# Patient Record
Sex: MALE | Race: WHITE | URBAN - METROPOLITAN AREA
[De-identification: names, ages, dates, MRNs, and addresses within clinical notes are randomized per-mention and may not be internally consistent; named-entity substitution may affect disease eponyms.]

---

## 2019-07-19 ENCOUNTER — NEW PATIENT (OUTPATIENT)
Dept: URBAN - METROPOLITAN AREA CLINIC 51 | Facility: CLINIC | Age: 54
End: 2019-07-19
Payer: COMMERCIAL

## 2019-07-19 DIAGNOSIS — D31.32 BENIGN NEOPLASM OF LEFT CHOROID: ICD-10-CM

## 2019-07-19 DIAGNOSIS — H25.13 AGE-RELATED NUCLEAR CATARACT, BILATERAL: ICD-10-CM

## 2019-07-19 DIAGNOSIS — H52.4 PRESBYOPIA: ICD-10-CM

## 2019-07-19 DIAGNOSIS — Z79.84 LONG TERM (CURRENT) USE OF ORAL ANTIDIABETIC DRUGS: ICD-10-CM

## 2019-07-19 DIAGNOSIS — E11.9 TYPE 2 DIABETES MELLITUS WITHOUT COMPLICATIONS: Primary | ICD-10-CM

## 2019-07-19 DIAGNOSIS — H04.123 TEAR FILM INSUFFICIENCY OF BILATERAL LACRIMAL GLANDS: ICD-10-CM

## 2019-07-19 PROCEDURE — 92250 FUNDUS PHOTOGRAPHY W/I&R: CPT | Performed by: OPTOMETRIST

## 2019-07-19 PROCEDURE — 92015 DETERMINE REFRACTIVE STATE: CPT | Performed by: OPTOMETRIST

## 2019-07-19 PROCEDURE — 92004 COMPRE OPH EXAM NEW PT 1/>: CPT | Performed by: OPTOMETRIST

## 2019-07-19 RX ORDER — CARBOXYMETHYLCELLULOSE SODIUM 5 MG/ML
0.5 % SOLUTION/ DROPS OPHTHALMIC
Qty: 90 | Refills: 6 | Status: ACTIVE
Start: 2019-07-19

## 2019-07-19 ASSESSMENT — KERATOMETRY
OD: 42.24
OS: 42.26

## 2019-07-19 ASSESSMENT — VISUAL ACUITY
OS: 20/20
OD: 20/20

## 2019-07-19 ASSESSMENT — INTRAOCULAR PRESSURE
OD: 20
OS: 20

## 2022-04-15 ENCOUNTER — OFFICE VISIT (OUTPATIENT)
Dept: URBAN - METROPOLITAN AREA CLINIC 51 | Facility: CLINIC | Age: 57
End: 2022-04-15
Payer: COMMERCIAL

## 2022-04-15 DIAGNOSIS — H16.213 EXPOSURE KERATOCONJUNCTIVITIS, BILATERAL: ICD-10-CM

## 2022-04-15 DIAGNOSIS — H43.813 VITREOUS DEGENERATION, BILATERAL: ICD-10-CM

## 2022-04-15 DIAGNOSIS — E11.9 TYPE 2 DIABETES MELLITUS WITHOUT COMPLICATIONS: Primary | ICD-10-CM

## 2022-04-15 DIAGNOSIS — H52.4 PRESBYOPIA: ICD-10-CM

## 2022-04-15 DIAGNOSIS — H25.13 AGE-RELATED NUCLEAR CATARACT, BILATERAL: ICD-10-CM

## 2022-04-15 DIAGNOSIS — Z79.84 LONG TERM (CURRENT) USE OF ORAL ANTIDIABETIC DRUGS: ICD-10-CM

## 2022-04-15 PROCEDURE — 92134 CPTRZ OPH DX IMG PST SGM RTA: CPT | Performed by: OPTOMETRIST

## 2022-04-15 PROCEDURE — 99214 OFFICE O/P EST MOD 30 MIN: CPT | Performed by: OPTOMETRIST

## 2022-04-15 PROCEDURE — 92250 FUNDUS PHOTOGRAPHY W/I&R: CPT | Performed by: OPTOMETRIST

## 2022-04-15 RX ORDER — CARBOXYMETHYLCELLULOSE SODIUM 5 MG/ML
0.5 % SOLUTION/ DROPS OPHTHALMIC
Qty: 90 | Refills: 6 | Status: ACTIVE
Start: 2022-04-15

## 2022-04-15 RX ORDER — CYCLOSPORINE 0.5 MG/ML
0.05 % EMULSION OPHTHALMIC
Qty: 120 | Refills: 6 | Status: INACTIVE
Start: 2022-04-15 | End: 2022-05-14

## 2022-04-15 NOTE — IMPRESSION/PLAN
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 *Reports he has been using Refresh Plus but not helping *Loves outdoor Plan: Hx of MRSA, dermatochalasis with hooding/steatoblepharon Pt has incomplete blinks/lid closure. I have reviewed with patient to continue with Refresh Plus ATs QID OU, keeping OU closed directly after instillation for a minimum of 30 seconds. Discussed to not use ceiling fans throughout the house. Sleep masking/goggles to wear at bedtime to prevent further exposure. Briefly reviewed Restasis. Pt wants to trial Restasis. Use BID OU. Understands medication will take 30-90 days to be effective. Begin Omega-3 FAs with EP recommended >600mg and 2-3000mg/day is most effective Warm compresses for 10 minutes of constant heat twice daily, which is the more important part of the treatment. (wet washcloth does not stay heated consistently, prefer Mina's Mask or rice in clean sock method) For debris around lid margins, use lid cleansers such as Theratears HypoChlor solution on a clean cotton pad (no cotton balls) and gently swipe along closed eyelid margins twice daily

## 2023-05-15 ENCOUNTER — OFFICE VISIT (OUTPATIENT)
Dept: URBAN - METROPOLITAN AREA CLINIC 51 | Facility: CLINIC | Age: 58
End: 2023-05-15
Payer: COMMERCIAL

## 2023-05-15 DIAGNOSIS — H25.13 AGE-RELATED NUCLEAR CATARACT, BILATERAL: ICD-10-CM

## 2023-05-15 DIAGNOSIS — H35.54 DYSTROPHIES PRIMARILY INVOLVING THE RETINAL PIGMENT EPITHELIUM: ICD-10-CM

## 2023-05-15 DIAGNOSIS — H52.4 PRESBYOPIA: ICD-10-CM

## 2023-05-15 DIAGNOSIS — H43.813 VITREOUS DEGENERATION, BILATERAL: ICD-10-CM

## 2023-05-15 DIAGNOSIS — Z79.84 LONG TERM (CURRENT) USE OF ORAL ANTIDIABETIC DRUGS: ICD-10-CM

## 2023-05-15 DIAGNOSIS — E11.9 TYPE 2 DIABETES MELLITUS WITHOUT COMPLICATIONS: Primary | ICD-10-CM

## 2023-05-15 DIAGNOSIS — H04.123 TEAR FILM INSUFFICIENCY OF BILATERAL LACRIMAL GLANDS: ICD-10-CM

## 2023-05-15 PROCEDURE — 92250 FUNDUS PHOTOGRAPHY W/I&R: CPT | Performed by: OPTOMETRIST

## 2023-05-15 PROCEDURE — 92134 CPTRZ OPH DX IMG PST SGM RTA: CPT | Performed by: OPTOMETRIST

## 2023-05-15 PROCEDURE — 99214 OFFICE O/P EST MOD 30 MIN: CPT | Performed by: OPTOMETRIST

## 2023-05-15 RX ORDER — CYCLOSPORINE 0.5 MG/ML
0.05 % EMULSION OPHTHALMIC
Qty: 120 | Refills: 6 | Status: ACTIVE
Start: 2023-05-15

## 2023-05-15 RX ORDER — EMPAGLIFLOZIN 10 MG/1
10 MG TABLET, FILM COATED ORAL
Qty: 0 | Refills: 0 | Status: ACTIVE
Start: 2023-05-15

## 2023-05-15 RX ORDER — CARBOXYMETHYLCELLULOSE SODIUM 5 MG/ML
0.5 % SOLUTION/ DROPS OPHTHALMIC
Qty: 180 | Refills: 6 | Status: ACTIVE
Start: 2023-05-15

## 2023-05-15 ASSESSMENT — INTRAOCULAR PRESSURE
OD: 18
OS: 19

## 2023-05-15 ASSESSMENT — KERATOMETRY
OD: 42.25
OS: 42.38

## 2023-05-15 ASSESSMENT — VISUAL ACUITY
OS: 20/25
OD: 20/20

## 2023-05-15 NOTE — IMPRESSION/PLAN
Impression: Tear film insufficiency of bilateral lacrimal glands *Patient states uses AFTs all day everyday but states they are not working. *Patient also states he works nights 11pm to 7am and notices his eyes get very tired and burn by the end of his shift. *Patient does use ceiling fans throughout house, does wear sunglasses. Due to work schedule, sleep has been shoddy. Plan: Reviewed this condition has no cure and dry eye treatment is focused on managing symptoms DAILY. Pt treatment will involve:
1) Dry warm compresses using Mina's Eye Mask or Rice-in-a-sock method for 10 minutes twice daily. This is the most important part of the treatment to help soften stagnant glands that can get clogged overtime and decrease lipid production of the tear film for tear film stability. 2) Patient to cont with preservative free artificial tears QID or more OU and cont with Restasis BID OU
3) Recommend moisture goggles to help prevent any exposure. 4) Hydrate daily (recommended half of body weight in ounces is ideal) 5) Minimize ceiling fan or direct air flow, suggest humidifier.

## 2023-05-15 NOTE — IMPRESSION/PLAN
Impression: Dystrophies primarily involving the retinal pigment epithelium: H35.54.  Plan: OS-CHRPE stable